# Patient Record
Sex: MALE | Race: WHITE | Employment: FULL TIME | URBAN - METROPOLITAN AREA
[De-identification: names, ages, dates, MRNs, and addresses within clinical notes are randomized per-mention and may not be internally consistent; named-entity substitution may affect disease eponyms.]

---

## 2017-02-24 ENCOUNTER — OFFICE VISIT (OUTPATIENT)
Dept: INTERNAL MEDICINE CLINIC | Age: 32
End: 2017-02-24

## 2017-02-24 ENCOUNTER — TELEPHONE (OUTPATIENT)
Dept: INTERNAL MEDICINE CLINIC | Age: 32
End: 2017-02-24

## 2017-02-24 VITALS
HEART RATE: 86 BPM | DIASTOLIC BLOOD PRESSURE: 82 MMHG | RESPIRATION RATE: 16 BRPM | OXYGEN SATURATION: 98 % | SYSTOLIC BLOOD PRESSURE: 112 MMHG | BODY MASS INDEX: 29.78 KG/M2 | WEIGHT: 208 LBS | HEIGHT: 70 IN

## 2017-02-24 DIAGNOSIS — H65.33 CHRONIC MUCOID OTITIS MEDIA OF BOTH EARS: Primary | ICD-10-CM

## 2017-02-24 DIAGNOSIS — R21 RASH: ICD-10-CM

## 2017-02-24 PROCEDURE — 99213 OFFICE O/P EST LOW 20 MIN: CPT | Performed by: INTERNAL MEDICINE

## 2017-02-24 RX ORDER — FLUTICASONE PROPIONATE 50 MCG
2 SPRAY, SUSPENSION (ML) NASAL 2 TIMES DAILY WITH MEALS
Qty: 1 BOTTLE | Refills: 3 | Status: SHIPPED | OUTPATIENT
Start: 2017-02-24 | End: 2018-08-14 | Stop reason: ALTCHOICE

## 2017-02-24 RX ORDER — BETAMETHASONE DIPROPIONATE 0.05 %
OINTMENT (GRAM) TOPICAL
Qty: 60 G | Refills: 1 | Status: SHIPPED | OUTPATIENT
Start: 2017-02-24 | End: 2018-08-14

## 2017-02-24 RX ORDER — METHYLPREDNISOLONE 4 MG/1
TABLET ORAL
Qty: 1 KIT | Refills: 0 | Status: SHIPPED | OUTPATIENT
Start: 2017-02-24 | End: 2017-03-02

## 2017-04-10 ENCOUNTER — OFFICE VISIT (OUTPATIENT)
Dept: INTERNAL MEDICINE CLINIC | Age: 32
End: 2017-04-10

## 2017-04-10 VITALS — HEIGHT: 70 IN | WEIGHT: 208 LBS | RESPIRATION RATE: 16 BRPM | BODY MASS INDEX: 29.78 KG/M2 | TEMPERATURE: 98.5 F

## 2017-04-10 DIAGNOSIS — J06.9 URI WITH COUGH AND CONGESTION: Primary | ICD-10-CM

## 2017-04-10 PROCEDURE — 99213 OFFICE O/P EST LOW 20 MIN: CPT | Performed by: INTERNAL MEDICINE

## 2017-04-10 RX ORDER — AMOXICILLIN AND CLAVULANATE POTASSIUM 875; 125 MG/1; MG/1
1 TABLET, FILM COATED ORAL 2 TIMES DAILY
Qty: 20 TABLET | Refills: 0 | Status: SHIPPED | OUTPATIENT
Start: 2017-04-10 | End: 2017-04-20

## 2018-08-14 ENCOUNTER — TELEPHONE (OUTPATIENT)
Dept: ORTHOPEDIC SURGERY | Age: 33
End: 2018-08-14

## 2018-08-14 ENCOUNTER — TELEPHONE (OUTPATIENT)
Dept: INTERNAL MEDICINE CLINIC | Age: 33
End: 2018-08-14

## 2018-08-14 ENCOUNTER — OFFICE VISIT (OUTPATIENT)
Dept: INTERNAL MEDICINE CLINIC | Age: 33
End: 2018-08-14

## 2018-08-14 VITALS
WEIGHT: 216 LBS | BODY MASS INDEX: 30.99 KG/M2 | HEART RATE: 102 BPM | TEMPERATURE: 99.7 F | SYSTOLIC BLOOD PRESSURE: 118 MMHG | DIASTOLIC BLOOD PRESSURE: 72 MMHG | OXYGEN SATURATION: 98 %

## 2018-08-14 DIAGNOSIS — M25.561 ACUTE PAIN OF RIGHT KNEE: Primary | ICD-10-CM

## 2018-08-14 DIAGNOSIS — R50.81 FEVER IN OTHER DISEASES: ICD-10-CM

## 2018-08-14 LAB
A/G RATIO: 1.6 (ref 1.1–2.2)
ALBUMIN SERPL-MCNC: 4.6 G/DL (ref 3.4–5)
ALP BLD-CCNC: 51 U/L (ref 40–129)
ALT SERPL-CCNC: 32 U/L (ref 10–40)
ANION GAP SERPL CALCULATED.3IONS-SCNC: 12 MMOL/L (ref 3–16)
AST SERPL-CCNC: 24 U/L (ref 15–37)
BASOPHILS ABSOLUTE: 0.1 K/UL (ref 0–0.2)
BASOPHILS RELATIVE PERCENT: 0.3 %
BILIRUB SERPL-MCNC: 0.4 MG/DL (ref 0–1)
BUN BLDV-MCNC: 18 MG/DL (ref 7–20)
C-REACTIVE PROTEIN, HIGH SENSITIVITY: 8.28 MG/L (ref 0.16–3)
CALCIUM SERPL-MCNC: 9.4 MG/DL (ref 8.3–10.6)
CHLORIDE BLD-SCNC: 100 MMOL/L (ref 99–110)
CO2: 29 MMOL/L (ref 21–32)
CREAT SERPL-MCNC: 0.7 MG/DL (ref 0.9–1.3)
EOSINOPHILS ABSOLUTE: 0.2 K/UL (ref 0–0.6)
EOSINOPHILS RELATIVE PERCENT: 1.2 %
GFR AFRICAN AMERICAN: >60
GFR NON-AFRICAN AMERICAN: >60
GLOBULIN: 2.8 G/DL
GLUCOSE BLD-MCNC: 153 MG/DL (ref 70–99)
HCT VFR BLD CALC: 43.2 % (ref 40.5–52.5)
HEMOGLOBIN: 14.3 G/DL (ref 13.5–17.5)
LYMPHOCYTES ABSOLUTE: 1 K/UL (ref 1–5.1)
LYMPHOCYTES RELATIVE PERCENT: 6.4 %
MCH RBC QN AUTO: 28.2 PG (ref 26–34)
MCHC RBC AUTO-ENTMCNC: 33.1 G/DL (ref 31–36)
MCV RBC AUTO: 85.4 FL (ref 80–100)
MONOCYTES ABSOLUTE: 1 K/UL (ref 0–1.3)
MONOCYTES RELATIVE PERCENT: 6.4 %
NEUTROPHILS ABSOLUTE: 12.8 K/UL (ref 1.7–7.7)
NEUTROPHILS RELATIVE PERCENT: 85.7 %
PDW BLD-RTO: 13.3 % (ref 12.4–15.4)
PLATELET # BLD: 225 K/UL (ref 135–450)
PMV BLD AUTO: 9.3 FL (ref 5–10.5)
POTASSIUM SERPL-SCNC: 4.5 MMOL/L (ref 3.5–5.1)
RBC # BLD: 5.05 M/UL (ref 4.2–5.9)
SODIUM BLD-SCNC: 141 MMOL/L (ref 136–145)
TOTAL PROTEIN: 7.4 G/DL (ref 6.4–8.2)
URIC ACID, SERUM: 5 MG/DL (ref 3.5–7.2)
WBC # BLD: 14.9 K/UL (ref 4–11)

## 2018-08-14 PROCEDURE — 99213 OFFICE O/P EST LOW 20 MIN: CPT | Performed by: INTERNAL MEDICINE

## 2018-08-14 PROCEDURE — 36415 COLL VENOUS BLD VENIPUNCTURE: CPT | Performed by: INTERNAL MEDICINE

## 2018-08-14 RX ORDER — DICLOFENAC SODIUM 75 MG/1
75 TABLET, DELAYED RELEASE ORAL 2 TIMES DAILY
Qty: 30 TABLET | Refills: 3 | Status: SHIPPED | OUTPATIENT
Start: 2018-08-14 | End: 2019-01-30

## 2018-08-14 ASSESSMENT — PATIENT HEALTH QUESTIONNAIRE - PHQ9
SUM OF ALL RESPONSES TO PHQ QUESTIONS 1-9: 0
1. LITTLE INTEREST OR PLEASURE IN DOING THINGS: 0
SUM OF ALL RESPONSES TO PHQ9 QUESTIONS 1 & 2: 0
SUM OF ALL RESPONSES TO PHQ QUESTIONS 1-9: 0
2. FEELING DOWN, DEPRESSED OR HOPELESS: 0

## 2018-08-14 NOTE — TELEPHONE ENCOUNTER
Pt wants to know if Dr. Vika Agee was able to get him in to see an Ortho Doc today for possible infection in right knee.    Please call the patient./

## 2018-08-15 ENCOUNTER — OFFICE VISIT (OUTPATIENT)
Dept: ORTHOPEDIC SURGERY | Age: 33
End: 2018-08-15

## 2018-08-15 ENCOUNTER — PRE-EVALUATION (OUTPATIENT)
Dept: ORTHOPEDIC SURGERY | Age: 33
End: 2018-08-15

## 2018-08-15 VITALS
WEIGHT: 212 LBS | DIASTOLIC BLOOD PRESSURE: 70 MMHG | SYSTOLIC BLOOD PRESSURE: 119 MMHG | HEART RATE: 80 BPM | HEIGHT: 70 IN | BODY MASS INDEX: 30.35 KG/M2

## 2018-08-15 DIAGNOSIS — M70.40 PREPATELLAR BURSITIS, UNSPECIFIED LATERALITY: ICD-10-CM

## 2018-08-15 DIAGNOSIS — M70.51 PATELLAR BURSITIS OF RIGHT KNEE: Primary | ICD-10-CM

## 2018-08-15 DIAGNOSIS — M25.561 RIGHT KNEE PAIN, UNSPECIFIED CHRONICITY: ICD-10-CM

## 2018-08-15 DIAGNOSIS — M70.51 PATELLAR BURSITIS OF RIGHT KNEE: ICD-10-CM

## 2018-08-15 PROCEDURE — 20610 DRAIN/INJ JOINT/BURSA W/O US: CPT | Performed by: ORTHOPAEDIC SURGERY

## 2018-08-15 PROCEDURE — 99243 OFF/OP CNSLTJ NEW/EST LOW 30: CPT | Performed by: ORTHOPAEDIC SURGERY

## 2018-08-15 PROCEDURE — APPSS15 APP SPLIT SHARED TIME 0-15 MINUTES: Performed by: NURSE PRACTITIONER

## 2018-08-15 NOTE — PROGRESS NOTES
Mahad Crabtree  [id number]  August 15, 2018    Chief Complaint   Patient presents with    Knee Pain     Right knee        History: The patient is a 57-year-old gentleman who is here for evaluation of his right knee. The patient reportedly woke up at 3 AM on Tuesday morning with a fever and right knee pain. He cannot recall a specific injury. 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 He also did some kneeling at that time. The patient ultimately presented to the emergency room and was evaluated. Attempt at aspiration of the right knee joint was performed in the emergency room. The aspirate was dry. He was sent home on Bactrim DS and diclofenac. He denies any numbness or tingling. This is a consult from Reyna Harrell MD for right knee pain and swelling. The patient's  past medical history, medications, allergies,  family history, social history, and have been reviewed, and dated and are recorded in the chart. Pertinent items are noted in HPI. Review of systems reviewed from Pertinent History Form dated on 8/15/18 and available in the patient's chart under the Media tab. Vitals:  /70   Pulse 80   Ht 5' 10\" (1.778 m)   Wt 212 lb (96.2 kg)   BMI 30.42 kg/m²     Physical: Mr. Mahad Crabtree appears well, he is in no apparent distress, he demonstrates appropriate mood & affect. He is alert and oriented to person, place and time. Examination of the right lower extremity reveals no pain with range of motion of the hip. He has generalized peripatellar tenderness. He does have a fullness to his skin over the patella and patellar tendon. There is a fluctuance to the knee over the pre-patellar region. There is mild erythema over the anterior aspect of the knee. There is no evidence of knee effusion. Range of motion of the knee was from 0° to 125°. He had minimal to no tenderness about the joint line of the right knee.   Strength is 4+ to 5/5 for all muscle groups

## 2018-08-15 NOTE — TELEPHONE ENCOUNTER
Call, him, I was told that Dr Debo Graff was supposed to see him today. Please check on this. Please call Dr Darius Bolanos office on this and make sure they are seeing him.

## 2018-08-20 ENCOUNTER — PRE-EVALUATION (OUTPATIENT)
Dept: ORTHOPEDIC SURGERY | Age: 33
End: 2018-08-20

## 2018-08-20 ENCOUNTER — OFFICE VISIT (OUTPATIENT)
Dept: ORTHOPEDIC SURGERY | Age: 33
End: 2018-08-20

## 2018-08-20 VITALS
DIASTOLIC BLOOD PRESSURE: 76 MMHG | WEIGHT: 212 LBS | HEART RATE: 76 BPM | BODY MASS INDEX: 30.35 KG/M2 | SYSTOLIC BLOOD PRESSURE: 133 MMHG | HEIGHT: 70 IN

## 2018-08-20 DIAGNOSIS — M70.40 PREPATELLAR BURSITIS, UNSPECIFIED LATERALITY: ICD-10-CM

## 2018-08-20 DIAGNOSIS — M71.161 SEPTIC PREPATELLAR BURSITIS OF RIGHT KNEE: Primary | ICD-10-CM

## 2018-08-20 LAB
CULTURE, JOINT AEROBIC: ABNORMAL
CULTURE, JOINT ANAEROBIC: ABNORMAL
GRAM STAIN RESULT: ABNORMAL
ORGANISM: ABNORMAL

## 2018-08-20 PROCEDURE — 99213 OFFICE O/P EST LOW 20 MIN: CPT | Performed by: ORTHOPAEDIC SURGERY

## 2018-08-20 PROCEDURE — APPSS15 APP SPLIT SHARED TIME 0-15 MINUTES: Performed by: NURSE PRACTITIONER

## 2018-08-20 NOTE — PROGRESS NOTES
Shante Leslie  [id number]  August 20, 2018    Chief Complaint   Patient presents with    Follow-up     Right knee septic prepatellar bursitis. History: The patient is a 70-year-old gentleman who is here for evaluation of his right knee. He reports significant improvement with regard to his knee pain and swelling. He has 4 more days of the Bactrim DS. He is now walking without any pain. He has return to work full duty status but has avoided direct pressure over the knee. He denies any fever or chills. The patient's  past medical history, medications, allergies,  family history, social history, and have been reviewed, and dated and are recorded in the chart. Pertinent items are noted in HPI. Review of systems reviewed from Pertinent History Form dated on 8/15/18 and available in the patient's chart under the Media tab. Vitals:  /76   Pulse 76   Ht 5' 10\" (1.778 m)   Wt 212 lb (96.2 kg)   BMI 30.42 kg/m²     Physical: Mr. Shante Leslie appears well, he is in no apparent distress, he demonstrates appropriate mood & affect. He is alert and oriented to person, place and time. Examination of the right lower extremity reveals no pain with range of motion of the hip. He has no further peripatellar tenderness. He does have a fullness to his skin over the patella and patellar tendon. There is no further fluctuance to the knee over the pre-patellar region. There is no fdurther erythema over the anterior aspect of the knee. There is no evidence of knee effusion. Range of motion of the knee was from 0° to 125°. He had no tenderness about the joint line of the right knee. Strength is 4+ to 5/5 for all muscle groups about the right knee. There is patellofemoral crepitus with range of motion of the right knee. Varus and valgus stressing of the knees reveals no evidence of instability. Anterior drawer and Lachman are negative bilaterally.    Examination of the skin reveals no rashes, ulceration,

## 2018-08-27 ENCOUNTER — OFFICE VISIT (OUTPATIENT)
Dept: ORTHOPEDIC SURGERY | Age: 33
End: 2018-08-27

## 2018-08-27 ENCOUNTER — PRE-EVALUATION (OUTPATIENT)
Dept: ORTHOPEDIC SURGERY | Age: 33
End: 2018-08-27

## 2018-08-27 VITALS
DIASTOLIC BLOOD PRESSURE: 75 MMHG | BODY MASS INDEX: 30.35 KG/M2 | HEART RATE: 81 BPM | HEIGHT: 70 IN | SYSTOLIC BLOOD PRESSURE: 113 MMHG | WEIGHT: 212 LBS

## 2018-08-27 DIAGNOSIS — M70.40 PREPATELLAR BURSITIS, UNSPECIFIED LATERALITY: ICD-10-CM

## 2018-08-27 DIAGNOSIS — M70.41 PREPATELLAR BURSITIS OF RIGHT KNEE: Primary | ICD-10-CM

## 2018-08-27 PROCEDURE — APPSS15 APP SPLIT SHARED TIME 0-15 MINUTES: Performed by: NURSE PRACTITIONER

## 2018-08-27 PROCEDURE — 99213 OFFICE O/P EST LOW 20 MIN: CPT | Performed by: ORTHOPAEDIC SURGERY

## 2018-08-27 RX ORDER — METHYLPREDNISOLONE 4 MG/1
TABLET ORAL
Qty: 1 KIT | Refills: 0 | Status: SHIPPED | OUTPATIENT
Start: 2018-08-27 | End: 2019-01-30

## 2018-08-27 NOTE — PROGRESS NOTES
Felipe Dudley  [id number]  August 27, 2018    Chief Complaint   Patient presents with    Follow-up     Right knee septic prepatellar bursitis. History: The patient is a 26-year-old gentleman who is here for evaluation of his right knee. He completed his Bactrim DS on Thursday. He noted some mild discomfort this morning, but denies true pain and wanted to be reevaluated. He has been working full duty status. He tells me he has not put any direct pressure over the knee. He has been walking quite a bit. He denies any fever or chills. The patient's  past medical history, medications, allergies,  family history, social history, and have been reviewed, and dated and are recorded in the chart. Pertinent items are noted in HPI. Review of systems reviewed from Pertinent History Form dated on 8/15/18 and available in the patient's chart under the Media tab. Vitals:  /75   Pulse 81   Ht 5' 10\" (1.778 m)   Wt 212 lb (96.2 kg)   BMI 30.42 kg/m²     Physical: Mr. Felipe Dudley appears well, he is in no apparent distress, he demonstrates appropriate mood & affect. He is alert and oriented to person, place and time. Examination of the right lower extremity reveals no pain with range of motion of the hip. He has no further peripatellar tenderness. He does have a fullness to his skin over the patella and patellar tendon. There is mild fluctuance to the knee over the pre-patellar region. There is no erythema over the anterior aspect of the knee. There is no warmth. There is no evidence of knee effusion. Range of motion of the knee was from 0° to 125°. He had no tenderness about the joint line of the right knee. Strength is 4+ to 5/5 for all muscle groups about the right knee. There is patellofemoral crepitus with range of motion of the right knee. Varus and valgus stressing of the knees reveals no evidence of instability. Anterior drawer and Lachman are negative bilaterally.    Examination of the skin reveals no rashes, ulceration, or lesion, bilaterally in the lower extremities. Sensation to both lower extremities is grossly intact. Exam of both feet reveals pedal pulses intact and brisk cap refill. Patient is able to dorsiflex and wiggle all toes. Deep tendon reflexes of the lower extremities are normal and symmetric. The culture from the prepatellar bursa aspiration grew beta strep Group C on 8/15/18. Impression: Right knee prepatellar bursitis    Plan: There is no clinical evidence for any ongoing infection. A Medrol Dosepak was sent to the patient's pharmacy. He will follow-up with us if he notes an increase in his pain, new swelling, warmth, redness, fever or any other concerning symptoms for infection.

## 2018-10-08 ENCOUNTER — TELEPHONE (OUTPATIENT)
Dept: INTERNAL MEDICINE CLINIC | Age: 33
End: 2018-10-08

## 2018-10-26 ENCOUNTER — NURSE ONLY (OUTPATIENT)
Dept: INTERNAL MEDICINE CLINIC | Age: 33
End: 2018-10-26
Payer: COMMERCIAL

## 2018-10-26 PROCEDURE — 90471 IMMUNIZATION ADMIN: CPT | Performed by: INTERNAL MEDICINE

## 2018-10-26 PROCEDURE — 90686 IIV4 VACC NO PRSV 0.5 ML IM: CPT | Performed by: INTERNAL MEDICINE

## 2018-10-26 NOTE — PROGRESS NOTES
Vaccine Information Sheet, \"Influenza - Inactivated\"  given to Olive Robins, or parent/legal guardian of  Olive Robins and verbalized understanding. Patient responses:    Have you ever had a reaction to a flu vaccine? No  Are you able to eat eggs without adverse effects? Yes  Do you have any current illness? No  Have you ever had Guillian [place] Syndrome? No    Flu vaccine given per order. Please see immunization tab.

## 2019-01-30 ENCOUNTER — OFFICE VISIT (OUTPATIENT)
Dept: INTERNAL MEDICINE CLINIC | Age: 34
End: 2019-01-30
Payer: COMMERCIAL

## 2019-01-30 VITALS
HEIGHT: 70 IN | OXYGEN SATURATION: 98 % | HEART RATE: 102 BPM | SYSTOLIC BLOOD PRESSURE: 123 MMHG | DIASTOLIC BLOOD PRESSURE: 74 MMHG | WEIGHT: 195 LBS | RESPIRATION RATE: 14 BRPM | BODY MASS INDEX: 27.92 KG/M2 | TEMPERATURE: 100.6 F

## 2019-01-30 DIAGNOSIS — R50.9 FEVER, UNSPECIFIED FEVER CAUSE: ICD-10-CM

## 2019-01-30 DIAGNOSIS — J01.10 ACUTE NON-RECURRENT FRONTAL SINUSITIS: Primary | ICD-10-CM

## 2019-01-30 LAB
INFLUENZA A ANTIGEN, POC: NORMAL
INFLUENZA B ANTIGEN, POC: NORMAL

## 2019-01-30 PROCEDURE — 99213 OFFICE O/P EST LOW 20 MIN: CPT | Performed by: NURSE PRACTITIONER

## 2019-01-30 PROCEDURE — 87804 INFLUENZA ASSAY W/OPTIC: CPT | Performed by: NURSE PRACTITIONER

## 2019-01-30 RX ORDER — AZITHROMYCIN 250 MG/1
TABLET, FILM COATED ORAL
Qty: 1 PACKET | Refills: 0 | Status: SHIPPED | OUTPATIENT
Start: 2019-01-30 | End: 2019-02-09

## 2019-01-30 ASSESSMENT — ENCOUNTER SYMPTOMS
CHOKING: 0
SORE THROAT: 1
ABDOMINAL PAIN: 0
SINUS PRESSURE: 1
VOMITING: 0
CHEST TIGHTNESS: 0
SINUS PAIN: 1
COLOR CHANGE: 0
COUGH: 0
NAUSEA: 0
WHEEZING: 0
DIARRHEA: 0
CHANGE IN BOWEL HABIT: 0
ABDOMINAL DISTENTION: 0

## 2019-04-22 ENCOUNTER — OFFICE VISIT (OUTPATIENT)
Dept: FAMILY MEDICINE CLINIC | Age: 34
End: 2019-04-22
Payer: COMMERCIAL

## 2019-04-22 VITALS
SYSTOLIC BLOOD PRESSURE: 112 MMHG | TEMPERATURE: 98.1 F | WEIGHT: 200 LBS | HEART RATE: 71 BPM | BODY MASS INDEX: 28.7 KG/M2 | DIASTOLIC BLOOD PRESSURE: 74 MMHG | OXYGEN SATURATION: 98 %

## 2019-04-22 DIAGNOSIS — B35.6 TINEA CRURIS: Primary | ICD-10-CM

## 2019-04-22 DIAGNOSIS — J30.2 SEASONAL ALLERGIC RHINITIS, UNSPECIFIED TRIGGER: ICD-10-CM

## 2019-04-22 PROCEDURE — 99203 OFFICE O/P NEW LOW 30 MIN: CPT | Performed by: FAMILY MEDICINE

## 2019-04-22 RX ORDER — CETIRIZINE HYDROCHLORIDE 10 MG/1
10 TABLET ORAL DAILY
COMMUNITY
End: 2019-07-18

## 2019-04-22 RX ORDER — FLUTICASONE PROPIONATE 50 MCG
1 SPRAY, SUSPENSION (ML) NASAL DAILY
Qty: 1 BOTTLE | Refills: 3 | Status: SHIPPED | OUTPATIENT
Start: 2019-04-22 | End: 2019-07-18

## 2019-04-22 ASSESSMENT — PATIENT HEALTH QUESTIONNAIRE - PHQ9
SUM OF ALL RESPONSES TO PHQ QUESTIONS 1-9: 0
1. LITTLE INTEREST OR PLEASURE IN DOING THINGS: 0
2. FEELING DOWN, DEPRESSED OR HOPELESS: 0
SUM OF ALL RESPONSES TO PHQ9 QUESTIONS 1 & 2: 0
SUM OF ALL RESPONSES TO PHQ QUESTIONS 1-9: 0

## 2019-04-22 ASSESSMENT — ENCOUNTER SYMPTOMS
EYE DISCHARGE: 0
TROUBLE SWALLOWING: 0
FACIAL SWELLING: 0
EYE ITCHING: 1
DIARRHEA: 0
COUGH: 0
ANAL BLEEDING: 0
NAUSEA: 0
SINUS PRESSURE: 0
RHINORRHEA: 1
SHORTNESS OF BREATH: 0
VOMITING: 0
SORE THROAT: 0
BLOOD IN STOOL: 0
WHEEZING: 0
ABDOMINAL PAIN: 0
CHEST TIGHTNESS: 0

## 2019-04-22 NOTE — PROGRESS NOTES
2019    Barb Rivera (:  1985) is a 37 y.o. male, here to establish care with pcp. The patient stated that he has a few concerns he wanted to discuss. Overall, he feels well and is only on medication for allergies. 1.  Tinea Cruris- has been prevalent for the last year. Has used otc medication with little results. Has tried medication in the later summer that helped but the discomfort returned. He is asking for a medication today. He stated it was erythematous, pruritic and mildly painful at times. 2.  Allergic rhinitis- patient stated that he takes Zyrtec but still has problems at times. He has used Flonase in the past.  Patient is a  and is around dust and other material.  He denied cough or wheezing at this time. Today, he denied chest pain, sob, n, v, or diarrhea. HPI    Review of Systems   Constitutional: Negative for activity change, fatigue, fever and unexpected weight change. HENT: Positive for rhinorrhea. Negative for congestion, ear pain, facial swelling, hearing loss, sinus pressure, sore throat and trouble swallowing. Eyes: Positive for itching. Negative for discharge and visual disturbance. Respiratory: Negative for cough, chest tightness, shortness of breath and wheezing. Cardiovascular: Negative for chest pain, palpitations and leg swelling. Gastrointestinal: Negative for abdominal pain, anal bleeding, blood in stool, diarrhea, nausea and vomiting. Endocrine: Negative for cold intolerance, heat intolerance, polydipsia and polyphagia. Genitourinary: Negative for decreased urine volume, discharge, dysuria, frequency, genital sores, penile pain, testicular pain and urgency. Musculoskeletal: Negative for arthralgias. Skin: Negative for rash. Neurological: Negative for dizziness, syncope, light-headedness and headaches. Hematological: Does not bruise/bleed easily. Psychiatric/Behavioral: Negative for suicidal ideas.  The patient is not nervous/anxious. Prior to Visit Medications    Medication Sig Taking? Authorizing Provider   cetirizine (ZYRTEC) 10 MG tablet Take 10 mg by mouth daily Yes Historical Provider, MD   Econazole Nitrate 1 % FOAM Apply 1 spray topically daily for 14 days Yes Manoj Salinas DO   fluticasone (FLONASE) 50 MCG/ACT nasal spray 1 spray by Each Nare route daily Yes Yaya Gates DO        No Known Allergies    Past Medical History:   Diagnosis Date    Back pain     L4-L5 bulging discs       History reviewed. No pertinent surgical history.     Social History     Socioeconomic History    Marital status:      Spouse name: Not on file    Number of children: Not on file    Years of education: Not on file    Highest education level: Not on file   Occupational History    Occupation: construction   Social Needs    Financial resource strain: Not on file    Food insecurity:     Worry: Not on file     Inability: Not on file    Transportation needs:     Medical: Not on file     Non-medical: Not on file   Tobacco Use    Smoking status: Never Smoker    Smokeless tobacco: Never Used   Substance and Sexual Activity    Alcohol use: No    Drug use: No    Sexual activity: Not Currently   Lifestyle    Physical activity:     Days per week: Not on file     Minutes per session: Not on file    Stress: Not on file   Relationships    Social connections:     Talks on phone: Not on file     Gets together: Not on file     Attends Latter day service: Not on file     Active member of club or organization: Not on file     Attends meetings of clubs or organizations: Not on file     Relationship status: Not on file    Intimate partner violence:     Fear of current or ex partner: Not on file     Emotionally abused: Not on file     Physically abused: Not on file     Forced sexual activity: Not on file   Other Topics Concern    Not on file   Social History Narrative    Not on file        Family History   Problem present. Cardiovascular: Normal rate, regular rhythm and intact distal pulses. No murmur heard. Pulmonary/Chest: Effort normal and breath sounds normal. He has no wheezes. He has no rales. Abdominal: Soft. Bowel sounds are normal. There is no tenderness. There is no guarding. Genitourinary:   Genitourinary Comments: Refused   Musculoskeletal: Normal range of motion. He exhibits no edema. Lymphadenopathy:     He has no cervical adenopathy. Neurological: He is alert and oriented to person, place, and time. He displays normal reflexes. No cranial nerve deficit. Skin:   Apparent rash between legs ,srotum, patient didn't want physical exam.    Psychiatric: He has a normal mood and affect. His behavior is normal. Judgment and thought content normal.       ASSESSMENT/PLAN:  1. Tinea cruris  Keep dry. Use medication as directed. Consider Derm referral if not improved. 2. Seasonal allergic rhinitis, unspecified trigger  Continue with medication as directed. Add Flonase. Allergy avoidance. Use mask. Consider allergy referral in future. RTC for adult exam later this year. Return in about 6 months (around 10/22/2019) for [facility] up adult well visit.

## 2019-04-24 ENCOUNTER — TELEPHONE (OUTPATIENT)
Dept: FAMILY MEDICINE CLINIC | Age: 34
End: 2019-04-24

## 2019-07-17 ASSESSMENT — ENCOUNTER SYMPTOMS
CHEST TIGHTNESS: 0
COUGH: 0
SHORTNESS OF BREATH: 0
VOMITING: 0
ANAL BLEEDING: 0
ABDOMINAL PAIN: 0
NAUSEA: 0
WHEEZING: 0
DIARRHEA: 0
BLOOD IN STOOL: 0

## 2019-07-18 ENCOUNTER — OFFICE VISIT (OUTPATIENT)
Dept: FAMILY MEDICINE CLINIC | Age: 34
End: 2019-07-18
Payer: COMMERCIAL

## 2019-07-18 ENCOUNTER — TELEPHONE (OUTPATIENT)
Dept: FAMILY MEDICINE CLINIC | Age: 34
End: 2019-07-18

## 2019-07-18 VITALS
WEIGHT: 205 LBS | DIASTOLIC BLOOD PRESSURE: 74 MMHG | HEIGHT: 70 IN | SYSTOLIC BLOOD PRESSURE: 114 MMHG | BODY MASS INDEX: 29.35 KG/M2 | HEART RATE: 83 BPM

## 2019-07-18 DIAGNOSIS — L73.9 FOLLICULITIS: Primary | ICD-10-CM

## 2019-07-18 DIAGNOSIS — L25.9 CONTACT DERMATITIS, UNSPECIFIED CONTACT DERMATITIS TYPE, UNSPECIFIED TRIGGER: ICD-10-CM

## 2019-07-18 PROCEDURE — 99213 OFFICE O/P EST LOW 20 MIN: CPT | Performed by: FAMILY MEDICINE

## 2019-07-18 RX ORDER — DOXYCYCLINE HYCLATE 100 MG
100 TABLET ORAL 2 TIMES DAILY
Qty: 20 TABLET | Refills: 0 | Status: SHIPPED | OUTPATIENT
Start: 2019-07-18 | End: 2019-07-28

## 2019-07-18 RX ORDER — CLINDAMYCIN PHOS/BENZOYL PEROX 1 %-5 %
GEL (GRAM) TOPICAL
Qty: 50 G | Refills: 0 | Status: SHIPPED | OUTPATIENT
Start: 2019-07-18

## 2019-07-18 RX ORDER — KETOCONAZOLE 2 G/100G
AEROSOL, FOAM TOPICAL
Qty: 100 G | Refills: 0 | Status: CANCELLED | OUTPATIENT
Start: 2019-07-18

## 2019-07-18 ASSESSMENT — PATIENT HEALTH QUESTIONNAIRE - PHQ9
SUM OF ALL RESPONSES TO PHQ QUESTIONS 1-9: 0
1. LITTLE INTEREST OR PLEASURE IN DOING THINGS: 0
SUM OF ALL RESPONSES TO PHQ QUESTIONS 1-9: 0
SUM OF ALL RESPONSES TO PHQ9 QUESTIONS 1 & 2: 0
2. FEELING DOWN, DEPRESSED OR HOPELESS: 0

## 2019-07-18 NOTE — PROGRESS NOTES
alert and oriented to person, place, and time. Skin: Rash noted. Raised pustules  White head noted  Under both arm along flank  Very erythematous  Appear around and in hair follicles    Psychiatric: He has a normal mood and affect. His behavior is normal. Judgment and thought content normal.       ASSESSMENT/PLAN:  1. Folliculitis  Medication provided  Answered questions  Discussed signs and symptoms to rtc. Antibiotic provided as well as topical medication    2. Contact dermatitis, unspecified contact dermatitis type, unspecified trigger  Antihistamine recommended. Keep clean with soap and water  Continue to keep are dry from sweat. No follow-ups on file.

## 2020-02-17 ENCOUNTER — TELEPHONE (OUTPATIENT)
Dept: FAMILY MEDICINE CLINIC | Age: 35
End: 2020-02-17

## 2020-02-17 RX ORDER — OSELTAMIVIR PHOSPHATE 75 MG/1
75 CAPSULE ORAL DAILY
Qty: 14 CAPSULE | Refills: 0 | Status: SHIPPED | OUTPATIENT
Start: 2020-02-17 | End: 2020-03-02

## 2020-02-17 NOTE — TELEPHONE ENCOUNTER
Pt need to get a rx for Tamaflu sent to [address] [place] [address] [phone]. His two kids were just diagnosed with the Flu B.  Please give pt a call if need be [phone]

## 2020-03-23 ENCOUNTER — TELEPHONE (OUTPATIENT)
Dept: FAMILY MEDICINE CLINIC | Age: 35
End: 2020-03-23

## 2020-03-24 NOTE — TELEPHONE ENCOUNTER
Spoke to pt, he stated he went to [facility] and was DX with strep throat

## 2020-10-13 ENCOUNTER — PATIENT MESSAGE (OUTPATIENT)
Dept: OTHER | Facility: CLINIC | Age: 35
End: 2020-10-13

## 2020-10-14 ENCOUNTER — OFFICE VISIT (OUTPATIENT)
Dept: FAMILY MEDICINE CLINIC | Age: 35
End: 2020-10-14
Payer: COMMERCIAL

## 2020-10-14 VITALS
SYSTOLIC BLOOD PRESSURE: 126 MMHG | TEMPERATURE: 97.3 F | WEIGHT: 208.6 LBS | HEIGHT: 70 IN | BODY MASS INDEX: 29.86 KG/M2 | DIASTOLIC BLOOD PRESSURE: 78 MMHG

## 2020-10-14 LAB
A/G RATIO: 1.6 (ref 1.1–2.2)
ALBUMIN SERPL-MCNC: 4.5 G/DL (ref 3.4–5)
ALP BLD-CCNC: 52 U/L (ref 40–129)
ALT SERPL-CCNC: 22 U/L (ref 10–40)
ANION GAP SERPL CALCULATED.3IONS-SCNC: 10 MMOL/L (ref 3–16)
AST SERPL-CCNC: 18 U/L (ref 15–37)
BASOPHILS ABSOLUTE: 0 K/UL (ref 0–0.2)
BASOPHILS RELATIVE PERCENT: 0.5 %
BILIRUB SERPL-MCNC: 0.4 MG/DL (ref 0–1)
BUN BLDV-MCNC: 14 MG/DL (ref 7–20)
CALCIUM SERPL-MCNC: 9.6 MG/DL (ref 8.3–10.6)
CHLORIDE BLD-SCNC: 100 MMOL/L (ref 99–110)
CHOLESTEROL, TOTAL: 150 MG/DL (ref 0–199)
CO2: 30 MMOL/L (ref 21–32)
CREAT SERPL-MCNC: 0.8 MG/DL (ref 0.9–1.3)
EOSINOPHILS ABSOLUTE: 0.6 K/UL (ref 0–0.6)
EOSINOPHILS RELATIVE PERCENT: 10.2 %
GFR AFRICAN AMERICAN: >60
GFR NON-AFRICAN AMERICAN: >60
GLOBULIN: 2.8 G/DL
GLUCOSE BLD-MCNC: 88 MG/DL (ref 70–99)
HCT VFR BLD CALC: 43.3 % (ref 40.5–52.5)
HDLC SERPL-MCNC: 42 MG/DL (ref 40–60)
HEMOGLOBIN: 14.5 G/DL (ref 13.5–17.5)
LDL CHOLESTEROL CALCULATED: 89 MG/DL
LYMPHOCYTES ABSOLUTE: 1.4 K/UL (ref 1–5.1)
LYMPHOCYTES RELATIVE PERCENT: 22.2 %
MCH RBC QN AUTO: 28.3 PG (ref 26–34)
MCHC RBC AUTO-ENTMCNC: 33.5 G/DL (ref 31–36)
MCV RBC AUTO: 84.5 FL (ref 80–100)
MONOCYTES ABSOLUTE: 0.5 K/UL (ref 0–1.3)
MONOCYTES RELATIVE PERCENT: 8.7 %
NEUTROPHILS ABSOLUTE: 3.7 K/UL (ref 1.7–7.7)
NEUTROPHILS RELATIVE PERCENT: 58.4 %
PDW BLD-RTO: 12.6 % (ref 12.4–15.4)
PLATELET # BLD: 259 K/UL (ref 135–450)
PMV BLD AUTO: 8.7 FL (ref 5–10.5)
POTASSIUM SERPL-SCNC: 4.4 MMOL/L (ref 3.5–5.1)
RBC # BLD: 5.12 M/UL (ref 4.2–5.9)
SODIUM BLD-SCNC: 140 MMOL/L (ref 136–145)
TOTAL PROTEIN: 7.3 G/DL (ref 6.4–8.2)
TRIGL SERPL-MCNC: 97 MG/DL (ref 0–150)
VLDLC SERPL CALC-MCNC: 19 MG/DL
WBC # BLD: 6.3 K/UL (ref 4–11)

## 2020-10-14 PROCEDURE — 36415 COLL VENOUS BLD VENIPUNCTURE: CPT | Performed by: FAMILY MEDICINE

## 2020-10-14 PROCEDURE — 90686 IIV4 VACC NO PRSV 0.5 ML IM: CPT | Performed by: FAMILY MEDICINE

## 2020-10-14 PROCEDURE — 90471 IMMUNIZATION ADMIN: CPT | Performed by: FAMILY MEDICINE

## 2020-10-14 PROCEDURE — 99395 PREV VISIT EST AGE 18-39: CPT | Performed by: FAMILY MEDICINE

## 2020-10-14 ASSESSMENT — ENCOUNTER SYMPTOMS
DIARRHEA: 0
SORE THROAT: 0
EYE DISCHARGE: 0
WHEEZING: 0
TROUBLE SWALLOWING: 0
ABDOMINAL PAIN: 0
BACK PAIN: 0
CHEST TIGHTNESS: 0
SINUS PRESSURE: 0
RHINORRHEA: 0
ANAL BLEEDING: 0
BLOOD IN STOOL: 0
NAUSEA: 0
VOMITING: 0
SHORTNESS OF BREATH: 0
COUGH: 0

## 2020-10-14 ASSESSMENT — PATIENT HEALTH QUESTIONNAIRE - PHQ9
1. LITTLE INTEREST OR PLEASURE IN DOING THINGS: 0
SUM OF ALL RESPONSES TO PHQ9 QUESTIONS 1 & 2: 0
2. FEELING DOWN, DEPRESSED OR HOPELESS: 0
SUM OF ALL RESPONSES TO PHQ QUESTIONS 1-9: 0
SUM OF ALL RESPONSES TO PHQ QUESTIONS 1-9: 0

## 2020-10-14 NOTE — PROGRESS NOTES
10/14/2020    Christi Casarez (:  1985) is a 38 y.o. male, here for evaluation of the following medical concerns:    HPI  -need to discuss vaccinations and the need from these  -need to discuss HIV testing and basic labs. -need to discuss healthy eating habits and exercise.   -need to discuss age appropriate screening recommendations  -need to have detailed conversation concerning BARB and PSA testing    Today, denied chest pain, sob, n, v, or diarrhea. Review of Systems   Constitutional: Negative for activity change, fatigue, fever and unexpected weight change. HENT: Negative for congestion, ear pain, hearing loss, rhinorrhea, sinus pressure, sore throat and trouble swallowing. Eyes: Negative for discharge and visual disturbance. Respiratory: Negative for cough, chest tightness, shortness of breath and wheezing. Cardiovascular: Negative for chest pain, palpitations and leg swelling. Gastrointestinal: Negative for abdominal pain, anal bleeding, blood in stool, diarrhea, nausea and vomiting. Endocrine: Negative for cold intolerance, heat intolerance, polydipsia and polyphagia. Genitourinary: Negative for decreased urine volume, discharge, dysuria, frequency, genital sores, penile pain, testicular pain and urgency. Musculoskeletal: Negative for arthralgias and back pain. Skin: Negative for rash. Neurological: Negative for dizziness, syncope, light-headedness and headaches. Psychiatric/Behavioral: Negative for dysphoric mood. The patient is not nervous/anxious. Prior to Visit Medications    Medication Sig Taking? Authorizing Provider   BENZACLIN 1-5 % gel Apply topically 2 times daily. Billye Din, DO        No Known Allergies    Past Medical History:   Diagnosis Date    Allergic rhinitis     Back pain     L4-L5 bulging discs       History reviewed. No pertinent surgical history.     Social History     Socioeconomic History    Marital status:      Spouse name: Not on file    Number of children: Not on file    Years of education: Not on file    Highest education level: Not on file   Occupational History    Occupation: construction   Social Needs    Financial resource strain: Not on file    Food insecurity     Worry: Not on file     Inability: Not on file   [employer] needs     Medical: Not on file     Non-medical: Not on file   Tobacco Use    Smoking status: Never Smoker    Smokeless tobacco: Never Used   Substance and Sexual Activity    Alcohol use: No    Drug use: No    Sexual activity: Not Currently   Lifestyle    Physical activity     Days per week: Not on file     Minutes per session: Not on file    Stress: Not on file   Relationships    Social connections     Talks on phone: Not on file     Gets together: Not on file     Attends [employer] service: Not on file     Active member of club or organization: Not on file     Attends meetings of clubs or organizations: Not on file     Relationship status: Not on file    Intimate partner violence     Fear of current or ex partner: Not on file     Emotionally abused: Not on file     Physically abused: Not on file     Forced sexual activity: Not on file   Other Topics Concern    Not on file   Social History Narrative    Not on file        Family History   Problem Relation Age of Onset    High Blood Pressure Mother     Other Mother         IGT    High Blood Pressure Father        Vitals:    10/14/20 0821   BP: 126/78   Temp: 97.3 °F (36.3 °C)   Weight: 208 lb 9.6 oz (94.6 kg)   Height: 5' 10\" (1.778 m)     Estimated body mass index is 29.93 kg/m² as calculated from the following:    Height as of this encounter: 5' 10\" (1.778 m). Weight as of this encounter: 208 lb 9.6 oz (94.6 kg).       Chemistry        Component Value Date/Time     08/14/2018 1311    K 4.2 08/14/2018 1447     08/14/2018 1311    CO2 29 08/14/2018 1311    BUN 18 08/14/2018 1311    CREATININE 0.7 (L) 08/14/2018 1311 Component Value Date/Time    CALCIUM 9.4 08/14/2018 1311    ALKPHOS 49 08/14/2018 1447    AST 23 08/14/2018 1447    ALT 32 08/14/2018 1447    BILITOT 0.3 08/14/2018 1447          Lab Results   Component Value Date    WBC 15.4 (H) 08/14/2018    HGB 13.8 08/14/2018    HCT 40.7 08/14/2018    MCV 84.7 08/14/2018     08/14/2018     No results found for: LABA1C  No results found for: EAG  No results found for: LABA1C  No components found for: CHLPL  Lab Results   Component Value Date    TRIG 36 09/26/2014     Lab Results   Component Value Date    HDL 50 09/26/2014     Lab Results   Component Value Date    LDLCALC 87 09/26/2014     Lab Results   Component Value Date    LABVLDL 7 09/26/2014       Physical Exam  Vitals signs and nursing note reviewed. Constitutional:       Appearance: He is well-developed. HENT:      Head: Normocephalic and atraumatic. Right Ear: Tympanic membrane, ear canal and external ear normal.      Left Ear: Tympanic membrane, ear canal and external ear normal.      Nose: Nose normal.      Mouth/Throat:      Mouth: Mucous membranes are moist.   Eyes:      Pupils: Pupils are equal, round, and reactive to light. Neck:      Thyroid: No thyromegaly. Cardiovascular:      Rate and Rhythm: Normal rate and regular rhythm. Heart sounds: No murmur. Pulmonary:      Effort: Pulmonary effort is normal.      Breath sounds: Normal breath sounds. No wheezing or rales. Abdominal:      General: Bowel sounds are normal.      Palpations: Abdomen is soft. Tenderness: There is no abdominal tenderness. Genitourinary:     Comments: Declined denied problems, understood the importance  Musculoskeletal: Normal range of motion. Skin:     Findings: No rash. Neurological:      Mental Status: He is alert and oriented to person, place, and time. Cranial Nerves: No cranial nerve deficit.       Deep Tendon Reflexes: Reflexes normal.   Psychiatric:         Behavior: Behavior normal. Judgment: Judgment normal.         ASSESSMENT/PLAN:  1. Examination, medical, general  discussed vaccinations and he agreed  -discussed HIV testing and basic labs he declined HIV agreed to basic labs  -discuseds healthy eating habits and exercise and answered questions  -discussed age appropriate screening recommendations  - detailed conversation concerning BARB and PSA testing, he declined. - CBC Auto Differential  - Comprehensive Metabolic Panel  - Lipid Panel    2. Family history of hyperlipidemia  Substantial history   Tries to be active and eat healthy diet  Will rtc in one year  Discussed preventive measures and educated on these.   - CBC Auto Differential  - Comprehensive Metabolic Panel  - Lipid Panel      Return in about 1 year (around 10/14/2021).

## 2020-10-14 NOTE — PROGRESS NOTES
Vaccine Information Sheet, \"Influenza - Inactivated\"  given to Lisset Mckay, or parent/legal guardian of  Lisset Mckay and verbalized understanding. Patient responses:    Have you ever had a reaction to a flu vaccine? No  Do you have any current illness? No  Have you ever had Guillian [place] Syndrome? No  Do you have a serious allergy to any of the follow: Neomycin, Polymyxin, Thimerosal, eggs or egg products? No    Flu vaccine given per order. Please see immunization tab. Risks and benefits explained. Current VIS given.

## 2020-12-24 ENCOUNTER — OFFICE VISIT (OUTPATIENT)
Dept: PRIMARY CARE CLINIC | Age: 35
End: 2020-12-24
Payer: COMMERCIAL

## 2020-12-24 PROCEDURE — 99211 OFF/OP EST MAY X REQ PHY/QHP: CPT | Performed by: NURSE PRACTITIONER

## 2020-12-24 NOTE — PROGRESS NOTES
Loly Mckeon received a viral test for COVID-19. They were educated on isolation and quarantine as appropriate. For any symptoms, they were directed to seek care from their PCP, given contact information to establish with a doctor, directed to an urgent care or the emergency room.

## 2020-12-25 LAB — SARS-COV-2: NOT DETECTED

## 2021-06-07 ENCOUNTER — VIRTUAL VISIT (OUTPATIENT)
Dept: FAMILY MEDICINE CLINIC | Age: 36
End: 2021-06-07
Payer: COMMERCIAL

## 2021-06-07 DIAGNOSIS — L73.9 FOLLICULITIS: Primary | ICD-10-CM

## 2021-06-07 PROCEDURE — 99213 OFFICE O/P EST LOW 20 MIN: CPT | Performed by: FAMILY MEDICINE

## 2021-06-07 RX ORDER — DOXYCYCLINE HYCLATE 100 MG
100 TABLET ORAL 2 TIMES DAILY
Qty: 20 TABLET | Refills: 1 | Status: SHIPPED | OUTPATIENT
Start: 2021-06-07 | End: 2021-06-17

## 2021-06-07 ASSESSMENT — ENCOUNTER SYMPTOMS
VOMITING: 0
NAUSEA: 0
ANAL BLEEDING: 0
DIARRHEA: 0
SHORTNESS OF BREATH: 0
COUGH: 0
ABDOMINAL PAIN: 0

## 2021-07-04 ENCOUNTER — VIRTUAL VISIT (OUTPATIENT)
Dept: PRIMARY CARE CLINIC | Age: 36
End: 2021-07-04
Payer: COMMERCIAL

## 2021-07-04 DIAGNOSIS — R30.0 DYSURIA: Primary | ICD-10-CM

## 2021-07-04 PROCEDURE — 98966 PH1 ASSMT&MGMT NQHP 5-10: CPT | Performed by: NURSE PRACTITIONER

## 2021-07-04 RX ORDER — CIPROFLOXACIN 500 MG/1
500 TABLET, FILM COATED ORAL 2 TIMES DAILY
Qty: 24 TABLET | Refills: 0 | Status: SHIPPED | OUTPATIENT
Start: 2021-07-04 | End: 2021-07-18

## 2021-07-04 NOTE — PROGRESS NOTES
Shanique Marcelino is a 43 y.o. male evaluated via telephone on 7/4/2021. Consent:  He and/or health care decision maker is aware that that he may receive a bill for this telephone service, depending on his insurance coverage, and has provided verbal consent to proceed: Yes      Documentation:  I communicated with the patient and/or health care decision maker about dysuria x 48 hours   Dysuria  Patient complains of dysuria, frequency, urgency, suprapubic pressure He has had symptoms for 2 days. Patient reports that he has been swimming in the ocean for the last 10 days in [facility]. Reports sudden onset of frequency and urgency (5x in the last 3 hours) and sore ness with emptying bladder. Patient also complains of none. Patient denies back pain, fever, headache, sorethroat and stomach ache. Patient does not have a history of recurrent UTI. Patient does not have a history of pyelonephritis. Details of this discussion including any medical advice provided:   Diagnoses and all orders for this visit:    Dysuria   May take azo OTC for 2 days for symptom relief   Push fluids  -     ciprofloxacin (CIPRO) 500 MG tablet; Take 1 tablet by mouth 2 times daily for 14 days          I affirm this is a Patient Initiated Episode with a Patient who has not had a related appointment within my department in the past 7 days or scheduled within the next 24 hours. Patient identification was verified at the start of the visit: Yes    Total Time: minutes: 5-10 minutes    The visit was conducted pursuant to the emergency declaration under the [facility], [address] [place] [address] authority and the [employer] and [employer]ar General Act. Patient identification was verified, and a caregiver was present when appropriate. The patient was located in a state where the provider was credentialed to provide care.     Note: not billable if this call serves to triage the patient into an appointment for the relevant concern      Kina Coma, APRN - CNP

## 2021-08-23 ENCOUNTER — TELEPHONE (OUTPATIENT)
Dept: FAMILY MEDICINE CLINIC | Age: 36
End: 2021-08-23

## 2021-08-23 NOTE — TELEPHONE ENCOUNTER
Pt called to get an appt for chest pain, tightness, congestion, mucus and coughing. Pt is scheduled for a in office appt for tomorrow. He wants someone to listening to his chest. His rapid covid test was neg. It is ok to come in the office?

## 2022-06-13 ENCOUNTER — TELEPHONE (OUTPATIENT)
Dept: FAMILY MEDICINE CLINIC | Age: 37
End: 2022-06-13

## 2022-06-13 NOTE — TELEPHONE ENCOUNTER
----- Message from John Quincykira sent at 6/13/2022 10:06 AM EDT -----  Subject: Message to Provider    QUESTIONS  Information for Provider? Patient is calling in regards to a rash on his   face. He had the same rash last year (folliculitis). Patient is wondering   if the Doxycycline could be called in again or if he needs to make an   appointment. Please call and advise as to whether or not patient needs to   be seen or if the medication can be called in.   ---------------------------------------------------------------------------  --------------  CALL BACK INFO  What is the best way for the office to contact you? OK to leave message on   voicemail  Preferred Call Back Phone Number? [phone]  ---------------------------------------------------------------------------  --------------  SCRIPT ANSWERS  Relationship to Patient?  Self

## 2022-06-14 ENCOUNTER — OFFICE VISIT (OUTPATIENT)
Dept: FAMILY MEDICINE CLINIC | Age: 37
End: 2022-06-14
Payer: COMMERCIAL

## 2022-06-14 VITALS
WEIGHT: 200 LBS | OXYGEN SATURATION: 97 % | SYSTOLIC BLOOD PRESSURE: 128 MMHG | BODY MASS INDEX: 28.7 KG/M2 | HEART RATE: 72 BPM | DIASTOLIC BLOOD PRESSURE: 70 MMHG

## 2022-06-14 DIAGNOSIS — J30.9 ALLERGIC RHINITIS, UNSPECIFIED SEASONALITY, UNSPECIFIED TRIGGER: ICD-10-CM

## 2022-06-14 DIAGNOSIS — L73.9 FOLLICULITIS: Primary | ICD-10-CM

## 2022-06-14 PROCEDURE — 99213 OFFICE O/P EST LOW 20 MIN: CPT | Performed by: FAMILY MEDICINE

## 2022-06-14 RX ORDER — DOXYCYCLINE HYCLATE 100 MG
100 TABLET ORAL 2 TIMES DAILY
Qty: 20 TABLET | Refills: 1 | Status: SHIPPED | OUTPATIENT
Start: 2022-06-14 | End: 2022-06-24

## 2022-06-14 SDOH — ECONOMIC STABILITY: FOOD INSECURITY: WITHIN THE PAST 12 MONTHS, THE FOOD YOU BOUGHT JUST DIDN'T LAST AND YOU DIDN'T HAVE MONEY TO GET MORE.: NEVER TRUE

## 2022-06-14 SDOH — ECONOMIC STABILITY: FOOD INSECURITY: WITHIN THE PAST 12 MONTHS, YOU WORRIED THAT YOUR FOOD WOULD RUN OUT BEFORE YOU GOT MONEY TO BUY MORE.: NEVER TRUE

## 2022-06-14 ASSESSMENT — SOCIAL DETERMINANTS OF HEALTH (SDOH): HOW HARD IS IT FOR YOU TO PAY FOR THE VERY BASICS LIKE FOOD, HOUSING, MEDICAL CARE, AND HEATING?: NOT HARD AT ALL

## 2022-06-14 ASSESSMENT — PATIENT HEALTH QUESTIONNAIRE - PHQ9
2. FEELING DOWN, DEPRESSED OR HOPELESS: 0
SUM OF ALL RESPONSES TO PHQ QUESTIONS 1-9: 0
SUM OF ALL RESPONSES TO PHQ9 QUESTIONS 1 & 2: 0
1. LITTLE INTEREST OR PLEASURE IN DOING THINGS: 0

## 2022-06-14 NOTE — PROGRESS NOTES
2022     Bret Briggs (:  1985) is a 42 y.o. male, here for evaluation of the following medical concerns:    HPI     Today, patient is following up with several concerns. Allergic rhinitis- rhinorrhea, nasal congestion,   Folliculitis- has had this for several weeks, secondary to shaving, working,  has had this in the past that resolved with oral antibiotics, no fever or chills, no other locations, itching, uncomfortable, whiteheads, pus at times.     Today, denied chest pain, sob,n, v, or diarrhea. Review of Systems   Constitutional: Negative for activity change, fatigue and fever. HENT: Positive for congestion, postnasal drip and rhinorrhea. Negative for ear pain, sinus pain and sore throat. Respiratory: Negative for shortness of breath. Cardiovascular: Negative for chest pain. Gastrointestinal: Negative for abdominal pain, diarrhea, nausea and vomiting. Skin: Positive for rash. Prior to Visit Medications    Medication Sig Taking? Authorizing Provider   doxycycline hyclate (VIBRA-TABS) 100 MG tablet Take 1 tablet by mouth 2 times daily for 10 days Yes Manoj Salinas DO   BENZACLIN 1-5 % gel Apply topically 2 times daily. Nicolas Patterson,         Social History     Tobacco Use    Smoking status: Never Smoker    Smokeless tobacco: Never Used   Substance Use Topics    Alcohol use: No        Vitals:    22 1159   BP: 128/70   Pulse: 72   SpO2: 97%   Weight: 200 lb (90.7 kg)     Estimated body mass index is 28.7 kg/m² as calculated from the following:    Height as of 10/14/20: 5' 10\" (1.778 m). Weight as of this encounter: 200 lb (90.7 kg). Physical Exam  Vitals and nursing note reviewed. Constitutional:       Appearance: He is well-developed. HENT:      Head: Normocephalic and atraumatic.       Right Ear: Tympanic membrane, ear canal and external ear normal.      Left Ear: Tympanic membrane, ear canal and external ear normal.   Neck:      Thyroid: No thyromegaly. Cardiovascular:      Rate and Rhythm: Normal rate and regular rhythm. Heart sounds: No murmur heard. Pulmonary:      Effort: Pulmonary effort is normal.      Breath sounds: Normal breath sounds. No wheezing or rales. Abdominal:      General: Bowel sounds are normal.      Palpations: Abdomen is soft. Tenderness: There is no abdominal tenderness. Skin:     Findings: Erythema and rash present. Neurological:      Mental Status: He is alert and oriented to person, place, and time. Psychiatric:         Behavior: Behavior normal.         ASSESSMENT/PLAN:  1. Folliculitis  Take medication as prescribed. Push fluids and wash face  Discussed conservative treatment  RTC if no improved. Tylenol/Ibuprofen for pain    2. Allergic rhinitis, unspecified seasonality, unspecified trigger  Stable  Continue with otc medication   Answered questions      No follow-ups on file.

## 2022-06-18 ASSESSMENT — ENCOUNTER SYMPTOMS
VOMITING: 0
RHINORRHEA: 1
SORE THROAT: 0
SHORTNESS OF BREATH: 0
ABDOMINAL PAIN: 0
SINUS PAIN: 0
DIARRHEA: 0
NAUSEA: 0